# Patient Record
Sex: MALE | Race: WHITE | NOT HISPANIC OR LATINO | URBAN - METROPOLITAN AREA
[De-identification: names, ages, dates, MRNs, and addresses within clinical notes are randomized per-mention and may not be internally consistent; named-entity substitution may affect disease eponyms.]

---

## 2018-01-31 ENCOUNTER — IMPORTED ENCOUNTER (OUTPATIENT)
Dept: URBAN - METROPOLITAN AREA CLINIC 31 | Facility: CLINIC | Age: 78
End: 2018-01-31

## 2018-01-31 PROBLEM — H40.1131: Noted: 2018-01-31

## 2018-01-31 PROBLEM — H25.813: Noted: 2018-01-31

## 2018-01-31 PROCEDURE — 92133 CPTRZD OPH DX IMG PST SGM ON: CPT

## 2018-01-31 PROCEDURE — 92014 COMPRE OPH EXAM EST PT 1/>: CPT

## 2018-01-31 NOTE — PATIENT DISCUSSION
1.  Primary open angle glaucoma OU mild - Continue with current treatment plan. Discussed importance of compliance. Will continue to monitor. Followed in [facility] during the summer. 2. Combined Types of Cataract OU: Explained how cataracts can effect vision. Recommend clinical observation. The patient was advised to contact us if any change or worsening of vision. 3. Return for an appointment in 12 months for comprehensive exam. OCT Nerve. with Dr. Livan Hilario.

## 2019-02-27 ENCOUNTER — IMPORTED ENCOUNTER (OUTPATIENT)
Dept: URBAN - METROPOLITAN AREA CLINIC 31 | Facility: CLINIC | Age: 79
End: 2019-02-27

## 2019-02-27 PROBLEM — H25.13: Noted: 2019-02-27

## 2019-02-27 PROBLEM — H40.1111: Noted: 2019-02-27

## 2019-02-27 PROBLEM — H40.1122: Noted: 2019-02-27

## 2019-02-27 PROCEDURE — 92014 COMPRE OPH EXAM EST PT 1/>: CPT

## 2019-02-27 PROCEDURE — 92133 CPTRZD OPH DX IMG PST SGM ON: CPT

## 2019-02-27 NOTE — PATIENT DISCUSSION
1.  Primary open angle glaucoma OS moderate- IOP higher but was running out of drops today. ?OCT worse OS. Continue with current treatment plan. Discussed importance of compliance. Will continue to monitor. Check VF in next month2. Primary open angle glaucoma OD mild:  Continue with current treatment plan. Discussed importance of compliance. Will continue to monitor. 3.  Nuclear Sclerotic Cataract OU: Explained how cataracts can effect vision. Recommend clinical observation. The patient was advised to contact us if any change or worsening of vision. 4. Return for an appointment in 1 month for pressure check. VF 24-2. with Dr. Ada Almanza.

## 2019-03-18 ENCOUNTER — IMPORTED ENCOUNTER (OUTPATIENT)
Dept: URBAN - METROPOLITAN AREA CLINIC 31 | Facility: CLINIC | Age: 79
End: 2019-03-18

## 2019-03-18 PROBLEM — H40.1111: Noted: 2019-03-18

## 2019-03-18 PROBLEM — H40.1122: Noted: 2019-03-18

## 2019-03-18 PROBLEM — H25.813: Noted: 2019-03-18

## 2019-03-18 PROCEDURE — 92083 EXTENDED VISUAL FIELD XM: CPT

## 2019-03-18 PROCEDURE — 99214 OFFICE O/P EST MOD 30 MIN: CPT

## 2019-03-18 NOTE — PATIENT DISCUSSION
Primary open angle glaucoma OD mild:  add latanoprost qhs. Discussed importance of compliance. Will continue to monitor.

## 2019-03-18 NOTE — PATIENT DISCUSSION
1.  Primary open angle glaucoma OS moderate-  Discussed importance of compliance. Will continue to monitor. IOP above target add latanoprost qhs combigan 2x/d.  f/u Dr Jaleel Staples in a month t/c SLT if not better. 2. Primary open angle glaucoma OD mild:  add latanoprost qhs. Discussed importance of compliance. Will continue to monitor. 3.  Combined Types of Cataract OU: Explained how cataracts can effect vision. Recommend clinical observation. The patient was advised to contact us if any change or worsening of vision. 4. Return for an appointment in 6 months for pressure check. OCT Nerve. with Dr. Dameon Mederos.

## 2020-09-21 NOTE — PATIENT DISCUSSION
S/P LASIK OU (MV BY DR. LUJAN) - HAPPY W/ DISTANCE (OS = DOM); UNHAPPY W/ READING RANGE. 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 DISC OPTION OF RLE OD ONLY FOR FOR MV NEAR. TRIAL FRAMED IN OFFICE HAPPY W/ CLARITY @ D/N. REFER TO DR. REGALADO FOR RLE EVAL OD ONLY; ADVISED HE WILL LIKELY SEND HER TO ME FOR TENTATIVE CL MV TRIAL PRIOR TO [id number] [id number].

## 2020-09-21 NOTE — PATIENT DISCUSSION
PT IS 2 MOS S/P LAST CHEMO/RADIATION TX 2' CERVICAL CANCER. UNDERSTANDS DR. REGALADO WILL LIKELY WAIT UNTIL BLOOD WORK, ETC. STABILIZES.

## 2020-10-08 NOTE — PATIENT DISCUSSION
The RxSight Light Adjustable Lens (RxLAL) is similar to other intraocular lenses (IOLs) that can be implanted in the eye to replace the natural lens that is removed during cataract surgery. While all IOLs improve vision after refractive surgery, most patients will require glasses (or contact lenses) to improve their vision to the level required for driving or reading. The RxLAL reduces the need for glasses or contact lenses by being able to change its focusing power after it is implanted in the eye. The focusing power of the RxLAL is adjusted by specific patterns of ultraviolet (UV) light produced by the RxSight Light Delivery Device (LDD), an instrument that the doctor uses in the office beginning 2-3 weeks after cataract surgery. Up to three light adjustment treatments can be performed to improve the patients vision, with a separation of 3 days between treatments. When the patient and doctor are satisfied with the vision, the same LDD is used to lockin the RxLAL and make the prescription permanent. From immediately after surgery until 24 hours after the completion of the lockin treatment, it was discussed that the need to protect the RxLAL from UV light in the environment by wearing GAGE specific protective eyewear provided during all waking hours.

## 2020-10-08 NOTE — PATIENT DISCUSSION
S/P HYPEROPIC LASIK OU - DISCUSSED WITH PATIENT OPTION OF ORA DUE TO DECREASED PREDICTABILITY OF MEASUREMENTS DURING REFRACTIVE LENS SURGERY. THE PT UNDERSTANDS HER VISUAL OUTCOME MAY BE BEST ACHEIVED WITH A LIGHT ADJUSTABLE LENS AND THE NEED FOR MORE SURGERY MAY BE NEEDED IF SHE CHOOSES CUSTOM SURGERY VS ADVANCED SURGERY. GAGE IS RECOMMENDED WITH A [employer] OF PLANO OD AND -1.75 OS.

## 2020-10-08 NOTE — PATIENT DISCUSSION
DISCUSSED BENEFIT FROM GAGE DUE TO FLEXIBILITY OF ADJUSTING THE IOL UP TO 3 TIMES AFTER SURGERY. PATIENT UNDERSTANDS THE NEED FOR FULL TIME UV PROTECTIVE GLASSES UNTIL 24 HOURS AFTER THE IOL IS LOCKED.

## 2020-10-08 NOTE — PATIENT DISCUSSION
REFRACTIVE ERROR OU - SUCCESFUL HISTORY OF MONOVISION OD FOR DISTANCE AND OS FOR NEAR. THE PT DESIRES A SIMULATION OF THIS WITH REFRACTIVE LENS EXCHANGE.

## 2020-11-19 NOTE — PATIENT DISCUSSION
***This patient had refractive cataract surgery performed with a monovision goal of -1.50 to give her satisfactory vision at near. A Light Adjustable IOL will be adjusted up to 3 times to reach the patients goal (which should provide them with satisfactory vision with the aid of glasses/contact lenses). ***

## 2020-11-24 NOTE — PATIENT DISCUSSION
S/P PC IOL (GAGE) , OD - BASELINE REFRACTION PERFORMED TODAY. PATIENT HAPPY WITH MONOVISION W/ OD SET FOR NEAR.

## 2021-03-16 ENCOUNTER — IMPORTED ENCOUNTER (OUTPATIENT)
Dept: URBAN - METROPOLITAN AREA CLINIC 31 | Facility: CLINIC | Age: 81
End: 2021-03-16

## 2021-03-16 PROBLEM — H40.1123: Noted: 2021-03-16

## 2021-03-16 PROBLEM — H25.13: Noted: 2021-03-16

## 2021-03-16 PROBLEM — H40.1111: Noted: 2021-03-16

## 2021-03-16 PROCEDURE — 92014 COMPRE OPH EXAM EST PT 1/>: CPT

## 2021-03-16 PROCEDURE — 92133 CPTRZD OPH DX IMG PST SGM ON: CPT

## 2021-03-16 NOTE — PATIENT DISCUSSION
1.  Primary open angle glaucoma OD mild:  Continue with current treatment plan. Discussed importance of compliance. Will continue to monitor for stability or progression. 2. Primary Open Angle Glaucoma OS Severe -  Continue with current treatment plan. Discussed importance of compliance. Will continue to monitor for stability or progression. VF defect on the 20019 test3. Nuclear Sclerotic Cataract OU: Explained how cataracts can effect vision. Recommend clinical observation. The patient was advised to contact us if any change or worsening of vision. Good IOP now. continue same Tx. Leaving to [place] in 6 weeks. IOP check upon return - 6 monthsReturn for an appointment in 6 months for pressure check. with Dr. Ellin Jeans.

## 2022-02-11 ENCOUNTER — IMPORTED ENCOUNTER (OUTPATIENT)
Dept: URBAN - METROPOLITAN AREA CLINIC 31 | Facility: CLINIC | Age: 82
End: 2022-02-11

## 2022-02-11 PROBLEM — H25.13: Noted: 2022-02-11

## 2022-02-11 PROBLEM — H40.1122: Noted: 2022-02-11

## 2022-02-11 PROBLEM — H40.1111: Noted: 2022-02-11

## 2022-02-11 PROCEDURE — 92015 DETERMINE REFRACTIVE STATE: CPT

## 2022-02-11 PROCEDURE — 99214 OFFICE O/P EST MOD 30 MIN: CPT

## 2022-02-11 NOTE — PATIENT DISCUSSION
1.  Primary open angle glaucoma OS moderate- IOP excellent  Continue with current treatment plan. Discussed importance of compliance. Will continue to monitor for stability or progression. 2. Primary open angle glaucoma OD mild:  Continue with current treatment plan. Discussed importance of compliance. Will continue to monitor for stability or progression. 3. Nuclear Sclerotic Cataract OU: Explained how cataracts can effect vision. Recommend clinical observation. The patient was advised to contact us if any change or worsening of vision. 4. Followup. [facility] 6 mo5. Followup 1 yr CE with Dr. Robin Navarro. Return for an appointment for OCT Nerve. with Dr. Robin Navarro.

## 2022-02-11 NOTE — PATIENT DISCUSSION
Followup 1 yr CE with Dr. Flavio Frankel. Return for an appointment for OCT Nerve. with Dr. Flavio Frankel.

## 2022-04-02 ASSESSMENT — VISUAL ACUITY
OD_CC: J1+
OS_CC: J1
OD_SC: 20/25+2
OU_SC: 20/25
OD_SC: 20/20
OD_CC: J1+-1
OS_CC: 20/60
OD_GLARE: 20/50
OS_CC: J1+
OS_CC: J1+
OS_GLARE: 20/50
OS_SC: 20/25-2
OS_SC: 20/20-3
OD_CC: J1
OD_SC: 20/20
OD_SC: 20/20
OD_SC: 20/25+2
OD_CC: 20/60
OS_SC: 20/25+2
OS_SC: 20/30+2
OS_SC: 20/20-1

## 2022-04-02 ASSESSMENT — TONOMETRY
OD_IOP_MMHG: 17
OD_IOP_MMHG: 12
OS_IOP_MMHG: 12
OS_IOP_MMHG: 23
OS_IOP_MMHG: 13
OD_IOP_MMHG: 23
OD_IOP_MMHG: 10
OS_IOP_MMHG: 18
OS_IOP_MMHG: 26
OD_IOP_MMHG: 24

## 2022-09-09 NOTE — PATIENT DISCUSSION
It was discussed and explained that reading glasses will be needed for the correction of presbyopia after refractive surgery starting around the age of [de-identified]. show

## 2023-03-05 NOTE — PATIENT DISCUSSION
Continue: prednisol ace-gatiflox-bromfen (prednisol ace-gatiflox-bromfen): drops,suspension: 1-0.5-0.075% 1 drop three times a day as directed into affected eye 10- lower abd

## 2023-04-11 ENCOUNTER — SURGERY/PROCEDURE (OUTPATIENT)
Dept: URBAN - METROPOLITAN AREA CLINIC 29 | Facility: CLINIC | Age: 83
End: 2023-04-11

## 2023-04-11 DIAGNOSIS — H40.031: ICD-10-CM

## 2023-04-11 PROCEDURE — 66761 REVISION OF IRIS: CPT

## 2023-04-18 ENCOUNTER — SURGERY/PROCEDURE (OUTPATIENT)
Dept: URBAN - METROPOLITAN AREA SURGERY 17 | Facility: SURGERY | Age: 83
End: 2023-04-18

## 2023-04-18 DIAGNOSIS — H40.032: ICD-10-CM

## 2023-04-18 PROCEDURE — 66761 REVISION OF IRIS: CPT

## 2023-05-01 ENCOUNTER — FOLLOW UP (OUTPATIENT)
Dept: URBAN - METROPOLITAN AREA CLINIC 29 | Facility: CLINIC | Age: 83
End: 2023-05-01

## 2023-05-01 DIAGNOSIS — H40.1122: ICD-10-CM

## 2023-05-01 DIAGNOSIS — H25.13: ICD-10-CM

## 2023-05-01 DIAGNOSIS — H40.033: ICD-10-CM

## 2023-05-01 DIAGNOSIS — H40.1111: ICD-10-CM

## 2023-05-01 PROCEDURE — 92012 INTRM OPH EXAM EST PATIENT: CPT

## 2023-05-01 ASSESSMENT — VISUAL ACUITY
OD_CC: 20/20-1
OS_CC: 20/25-2

## 2023-05-01 ASSESSMENT — TONOMETRY
OS_IOP_MMHG: 11
OD_IOP_MMHG: 10

## 2024-03-15 ENCOUNTER — COMPREHENSIVE EXAM (OUTPATIENT)
Dept: URBAN - METROPOLITAN AREA CLINIC 29 | Facility: CLINIC | Age: 84
End: 2024-03-15

## 2024-03-15 DIAGNOSIS — H40.033: ICD-10-CM

## 2024-03-15 DIAGNOSIS — H25.13: ICD-10-CM

## 2024-03-15 DIAGNOSIS — H40.1122: ICD-10-CM

## 2024-03-15 DIAGNOSIS — H40.1111: ICD-10-CM

## 2024-03-15 PROCEDURE — 92014 COMPRE OPH EXAM EST PT 1/>: CPT

## 2024-03-15 PROCEDURE — 92083 EXTENDED VISUAL FIELD XM: CPT

## 2024-03-15 ASSESSMENT — TONOMETRY
OS_IOP_MMHG: 15
OD_IOP_MMHG: 13
OD_IOP_MMHG: 12

## 2024-03-15 ASSESSMENT — VISUAL ACUITY
OS_CC: 20/25
OD_CC: 20/25+2

## 2024-04-08 ENCOUNTER — FOLLOW UP (OUTPATIENT)
Dept: URBAN - METROPOLITAN AREA CLINIC 29 | Facility: CLINIC | Age: 84
End: 2024-04-08

## 2024-04-08 DIAGNOSIS — H40.1122: ICD-10-CM

## 2024-04-08 DIAGNOSIS — H40.1111: ICD-10-CM

## 2024-04-08 DIAGNOSIS — H25.13: ICD-10-CM

## 2024-04-08 PROCEDURE — 99213 OFFICE O/P EST LOW 20 MIN: CPT

## 2024-04-08 ASSESSMENT — VISUAL ACUITY
OS_CC: 20/25
OD_CC: 20/25-2

## 2024-04-08 ASSESSMENT — TONOMETRY
OS_IOP_MMHG: 11
OD_IOP_MMHG: 12

## 2024-11-01 NOTE — PATIENT DISCUSSION
Nuclear Sclerotic Cataract OU: Explained how cataracts can effect vision. Recommend clinical observation. The patient was advised to contact us if any change or worsening of vision. (V5) oriented

## 2025-02-03 ENCOUNTER — COMPREHENSIVE EXAM (OUTPATIENT)
Age: 85
End: 2025-02-03

## 2025-02-03 DIAGNOSIS — H25.13: ICD-10-CM

## 2025-02-03 DIAGNOSIS — H40.1111: ICD-10-CM

## 2025-02-03 DIAGNOSIS — H40.1122: ICD-10-CM

## 2025-02-03 DIAGNOSIS — H40.033: ICD-10-CM

## 2025-02-03 DIAGNOSIS — H52.222: ICD-10-CM

## 2025-02-03 DIAGNOSIS — H52.4: ICD-10-CM

## 2025-02-03 DIAGNOSIS — H52.03: ICD-10-CM

## 2025-02-03 PROCEDURE — 99214 OFFICE O/P EST MOD 30 MIN: CPT

## 2025-02-03 PROCEDURE — 92015 DETERMINE REFRACTIVE STATE: CPT

## 2025-02-03 PROCEDURE — 92133 CPTRZD OPH DX IMG PST SGM ON: CPT
